# Patient Record
Sex: FEMALE | URBAN - METROPOLITAN AREA
[De-identification: names, ages, dates, MRNs, and addresses within clinical notes are randomized per-mention and may not be internally consistent; named-entity substitution may affect disease eponyms.]

---

## 2024-02-09 ENCOUNTER — TELEPHONE (OUTPATIENT)
Dept: BARIATRICS/WEIGHT MGMT | Facility: CLINIC | Age: 39
End: 2024-02-09

## 2024-02-09 NOTE — TELEPHONE ENCOUNTER
Receive packet for bariatric surgery from [facility]. Pt has hx of borderline personality disorder and unfortunately this is a hard stop for bariatric surgery. Called pt to discuss that she would not be a  candidate for bariatric surgery with this history. I did offer her the medical weight management program or the medical nutritional therapy program but pt declined at this time.